# Patient Record
Sex: MALE | Race: WHITE | NOT HISPANIC OR LATINO | URBAN - METROPOLITAN AREA
[De-identification: names, ages, dates, MRNs, and addresses within clinical notes are randomized per-mention and may not be internally consistent; named-entity substitution may affect disease eponyms.]

---

## 2018-11-13 ENCOUNTER — INPATIENT (INPATIENT)
Facility: HOSPITAL | Age: 58
LOS: 1 days | Discharge: HOME | DRG: 638 | End: 2018-11-15
Attending: FAMILY MEDICINE | Admitting: FAMILY MEDICINE
Payer: COMMERCIAL

## 2018-11-13 VITALS
SYSTOLIC BLOOD PRESSURE: 126 MMHG | OXYGEN SATURATION: 98 % | WEIGHT: 175.05 LBS | HEIGHT: 65 IN | HEART RATE: 130 BPM | DIASTOLIC BLOOD PRESSURE: 79 MMHG | RESPIRATION RATE: 16 BRPM

## 2018-11-13 DIAGNOSIS — E13.10 OTHER SPECIFIED DIABETES MELLITUS WITH KETOACIDOSIS WITHOUT COMA: ICD-10-CM

## 2018-11-13 DIAGNOSIS — R00.0 TACHYCARDIA, UNSPECIFIED: ICD-10-CM

## 2018-11-13 DIAGNOSIS — N17.9 ACUTE KIDNEY FAILURE, UNSPECIFIED: ICD-10-CM

## 2018-11-13 DIAGNOSIS — Z29.9 ENCOUNTER FOR PROPHYLACTIC MEASURES, UNSPECIFIED: ICD-10-CM

## 2018-11-13 DIAGNOSIS — E10.10 TYPE 1 DIABETES MELLITUS WITH KETOACIDOSIS WITHOUT COMA: ICD-10-CM

## 2018-11-13 LAB
ACETONE SERPL-MCNC: ABNORMAL
ALBUMIN SERPL ELPH-MCNC: 4 G/DL — SIGNIFICANT CHANGE UP (ref 3.3–5)
ALP SERPL-CCNC: 101 U/L — SIGNIFICANT CHANGE UP (ref 30–120)
ALT FLD-CCNC: 53 U/L DA — SIGNIFICANT CHANGE UP (ref 10–60)
AMPHET UR-MCNC: NEGATIVE — SIGNIFICANT CHANGE UP
ANION GAP SERPL CALC-SCNC: 11 MMOL/L — SIGNIFICANT CHANGE UP (ref 5–17)
ANION GAP SERPL CALC-SCNC: 17 MMOL/L — SIGNIFICANT CHANGE UP (ref 5–17)
APPEARANCE UR: CLEAR — SIGNIFICANT CHANGE UP
APTT BLD: 26.4 SEC — LOW (ref 27.5–36.3)
AST SERPL-CCNC: 25 U/L — SIGNIFICANT CHANGE UP (ref 10–40)
BARBITURATES UR SCN-MCNC: NEGATIVE — SIGNIFICANT CHANGE UP
BASE EXCESS BLDV CALC-SCNC: -3.9 MMOL/L — LOW (ref -2–2)
BASOPHILS # BLD AUTO: 0.05 K/UL — SIGNIFICANT CHANGE UP (ref 0–0.2)
BASOPHILS NFR BLD AUTO: 0.3 % — SIGNIFICANT CHANGE UP (ref 0–2)
BENZODIAZ UR-MCNC: NEGATIVE — SIGNIFICANT CHANGE UP
BILIRUB SERPL-MCNC: 0.8 MG/DL — SIGNIFICANT CHANGE UP (ref 0.2–1.2)
BILIRUB UR-MCNC: NEGATIVE — SIGNIFICANT CHANGE UP
BUN SERPL-MCNC: 17 MG/DL — SIGNIFICANT CHANGE UP (ref 7–23)
BUN SERPL-MCNC: 19 MG/DL — SIGNIFICANT CHANGE UP (ref 7–23)
CALCIUM SERPL-MCNC: 8.1 MG/DL — LOW (ref 8.4–10.5)
CALCIUM SERPL-MCNC: 9.9 MG/DL — SIGNIFICANT CHANGE UP (ref 8.4–10.5)
CHLORIDE SERPL-SCNC: 105 MMOL/L — SIGNIFICANT CHANGE UP (ref 96–108)
CHLORIDE SERPL-SCNC: 98 MMOL/L — SIGNIFICANT CHANGE UP (ref 96–108)
CO2 SERPL-SCNC: 24 MMOL/L — SIGNIFICANT CHANGE UP (ref 22–31)
CO2 SERPL-SCNC: 24 MMOL/L — SIGNIFICANT CHANGE UP (ref 22–31)
COCAINE METAB.OTHER UR-MCNC: NEGATIVE — SIGNIFICANT CHANGE UP
COLOR SPEC: YELLOW — SIGNIFICANT CHANGE UP
CREAT SERPL-MCNC: 1.14 MG/DL — SIGNIFICANT CHANGE UP (ref 0.5–1.3)
CREAT SERPL-MCNC: 1.41 MG/DL — HIGH (ref 0.5–1.3)
D DIMER BLD IA.RAPID-MCNC: <150 NG/ML DDU — SIGNIFICANT CHANGE UP
DIFF PNL FLD: ABNORMAL
EOSINOPHIL # BLD AUTO: 0.01 K/UL — SIGNIFICANT CHANGE UP (ref 0–0.5)
EOSINOPHIL NFR BLD AUTO: 0.1 % — SIGNIFICANT CHANGE UP (ref 0–6)
ETHANOL SERPL-MCNC: <3 MG/DL — SIGNIFICANT CHANGE UP (ref 0–3)
GAS PNL BLDV: SIGNIFICANT CHANGE UP
GLUCOSE BLDC GLUCOMTR-MCNC: 236 MG/DL — HIGH (ref 70–99)
GLUCOSE BLDC GLUCOMTR-MCNC: 309 MG/DL — HIGH (ref 70–99)
GLUCOSE BLDC GLUCOMTR-MCNC: 378 MG/DL — HIGH (ref 70–99)
GLUCOSE SERPL-MCNC: 220 MG/DL — HIGH (ref 70–99)
GLUCOSE SERPL-MCNC: 392 MG/DL — HIGH (ref 70–99)
GLUCOSE UR QL: 1000 MG/DL
HCO3 BLDV-SCNC: 20 MMOL/L — LOW (ref 21–29)
HCT VFR BLD CALC: 45.8 % — SIGNIFICANT CHANGE UP (ref 39–50)
HGB BLD-MCNC: 15.9 G/DL — SIGNIFICANT CHANGE UP (ref 13–17)
IMM GRANULOCYTES NFR BLD AUTO: 0.5 % — SIGNIFICANT CHANGE UP (ref 0–1.5)
INR BLD: 1.15 RATIO — SIGNIFICANT CHANGE UP (ref 0.88–1.16)
KETONES UR-MCNC: ABNORMAL
LACTATE SERPL-SCNC: 2.3 MMOL/L — HIGH (ref 0.7–2)
LACTATE SERPL-SCNC: 3.3 MMOL/L — HIGH (ref 0.7–2)
LEUKOCYTE ESTERASE UR-ACNC: NEGATIVE — SIGNIFICANT CHANGE UP
LYMPHOCYTES # BLD AUTO: 0.93 K/UL — LOW (ref 1–3.3)
LYMPHOCYTES # BLD AUTO: 4.9 % — LOW (ref 13–44)
MAGNESIUM SERPL-MCNC: 1.5 MG/DL — LOW (ref 1.6–2.6)
MCHC RBC-ENTMCNC: 30.9 PG — SIGNIFICANT CHANGE UP (ref 27–34)
MCHC RBC-ENTMCNC: 34.7 GM/DL — SIGNIFICANT CHANGE UP (ref 32–36)
MCV RBC AUTO: 89.1 FL — SIGNIFICANT CHANGE UP (ref 80–100)
METHADONE UR-MCNC: NEGATIVE — SIGNIFICANT CHANGE UP
MONOCYTES # BLD AUTO: 1.47 K/UL — HIGH (ref 0–0.9)
MONOCYTES NFR BLD AUTO: 7.8 % — SIGNIFICANT CHANGE UP (ref 2–14)
NEUTROPHILS # BLD AUTO: 16.37 K/UL — HIGH (ref 1.8–7.4)
NEUTROPHILS NFR BLD AUTO: 86.4 % — HIGH (ref 43–77)
NITRITE UR-MCNC: NEGATIVE — SIGNIFICANT CHANGE UP
NRBC # BLD: 0 /100 WBCS — SIGNIFICANT CHANGE UP (ref 0–0)
OPIATES UR-MCNC: NEGATIVE — SIGNIFICANT CHANGE UP
PCO2 BLDV: 45 MMHG — SIGNIFICANT CHANGE UP (ref 35–50)
PCP SPEC-MCNC: SIGNIFICANT CHANGE UP
PCP UR-MCNC: NEGATIVE — SIGNIFICANT CHANGE UP
PH BLDV: 7.3 — LOW (ref 7.35–7.45)
PH UR: 5 — SIGNIFICANT CHANGE UP (ref 5–8)
PHOSPHATE SERPL-MCNC: 2.2 MG/DL — LOW (ref 2.5–4.5)
PLATELET # BLD AUTO: 363 K/UL — SIGNIFICANT CHANGE UP (ref 150–400)
PO2 BLDV: 30 MMHG — SIGNIFICANT CHANGE UP (ref 25–45)
POTASSIUM SERPL-MCNC: 4 MMOL/L — SIGNIFICANT CHANGE UP (ref 3.5–5.3)
POTASSIUM SERPL-MCNC: 4.4 MMOL/L — SIGNIFICANT CHANGE UP (ref 3.5–5.3)
POTASSIUM SERPL-SCNC: 4 MMOL/L — SIGNIFICANT CHANGE UP (ref 3.5–5.3)
POTASSIUM SERPL-SCNC: 4.4 MMOL/L — SIGNIFICANT CHANGE UP (ref 3.5–5.3)
PROT SERPL-MCNC: 7.5 G/DL — SIGNIFICANT CHANGE UP (ref 6–8.3)
PROT UR-MCNC: 30 MG/DL
PROTHROM AB SERPL-ACNC: 12.6 SEC — SIGNIFICANT CHANGE UP (ref 10–12.9)
RBC # BLD: 5.14 M/UL — SIGNIFICANT CHANGE UP (ref 4.2–5.8)
RBC # FLD: 12.7 % — SIGNIFICANT CHANGE UP (ref 10.3–14.5)
RBC CASTS # UR COMP ASSIST: SIGNIFICANT CHANGE UP /HPF (ref 0–4)
SAO2 % BLDV: 49 % — LOW (ref 67–88)
SODIUM SERPL-SCNC: 139 MMOL/L — SIGNIFICANT CHANGE UP (ref 135–145)
SODIUM SERPL-SCNC: 140 MMOL/L — SIGNIFICANT CHANGE UP (ref 135–145)
SP GR SPEC: 1.02 — SIGNIFICANT CHANGE UP (ref 1.01–1.02)
THC UR QL: POSITIVE
TROPONIN I SERPL-MCNC: 0 NG/ML — LOW (ref 0.02–0.06)
UROBILINOGEN FLD QL: NEGATIVE MG/DL — SIGNIFICANT CHANGE UP
WBC # BLD: 18.93 K/UL — HIGH (ref 3.8–10.5)
WBC # FLD AUTO: 18.93 K/UL — HIGH (ref 3.8–10.5)
WBC UR QL: SIGNIFICANT CHANGE UP

## 2018-11-13 PROCEDURE — 71045 X-RAY EXAM CHEST 1 VIEW: CPT | Mod: 26

## 2018-11-13 PROCEDURE — 99285 EMERGENCY DEPT VISIT HI MDM: CPT

## 2018-11-13 PROCEDURE — 93010 ELECTROCARDIOGRAM REPORT: CPT

## 2018-11-13 PROCEDURE — 99223 1ST HOSP IP/OBS HIGH 75: CPT | Mod: AI

## 2018-11-13 RX ORDER — INSULIN GLARGINE 100 [IU]/ML
10 INJECTION, SOLUTION SUBCUTANEOUS AT BEDTIME
Qty: 0 | Refills: 0 | Status: DISCONTINUED | OUTPATIENT
Start: 2018-11-13 | End: 2018-11-15

## 2018-11-13 RX ORDER — SODIUM CHLORIDE 9 MG/ML
1000 INJECTION, SOLUTION INTRAVENOUS
Qty: 0 | Refills: 0 | Status: DISCONTINUED | OUTPATIENT
Start: 2018-11-13 | End: 2018-11-15

## 2018-11-13 RX ORDER — INSULIN LISPRO 100/ML
VIAL (ML) SUBCUTANEOUS
Qty: 0 | Refills: 0 | Status: DISCONTINUED | OUTPATIENT
Start: 2018-11-13 | End: 2018-11-15

## 2018-11-13 RX ORDER — DEXTROSE 50 % IN WATER 50 %
25 SYRINGE (ML) INTRAVENOUS ONCE
Qty: 0 | Refills: 0 | Status: DISCONTINUED | OUTPATIENT
Start: 2018-11-13 | End: 2018-11-15

## 2018-11-13 RX ORDER — SODIUM CHLORIDE 9 MG/ML
1000 INJECTION, SOLUTION INTRAVENOUS
Qty: 0 | Refills: 0 | Status: DISCONTINUED | OUTPATIENT
Start: 2018-11-13 | End: 2018-11-13

## 2018-11-13 RX ORDER — HEPARIN SODIUM 5000 [USP'U]/ML
5000 INJECTION INTRAVENOUS; SUBCUTANEOUS EVERY 8 HOURS
Qty: 0 | Refills: 0 | Status: DISCONTINUED | OUTPATIENT
Start: 2018-11-13 | End: 2018-11-15

## 2018-11-13 RX ORDER — ATORVASTATIN CALCIUM 80 MG/1
40 TABLET, FILM COATED ORAL AT BEDTIME
Qty: 0 | Refills: 0 | Status: DISCONTINUED | OUTPATIENT
Start: 2018-11-13 | End: 2018-11-15

## 2018-11-13 RX ORDER — INSULIN HUMAN 100 [IU]/ML
5 INJECTION, SOLUTION SUBCUTANEOUS
Qty: 100 | Refills: 0 | Status: DISCONTINUED | OUTPATIENT
Start: 2018-11-13 | End: 2018-11-14

## 2018-11-13 RX ORDER — SODIUM CHLORIDE 9 MG/ML
1000 INJECTION, SOLUTION INTRAVENOUS
Qty: 0 | Refills: 0 | Status: DISCONTINUED | OUTPATIENT
Start: 2018-11-13 | End: 2018-11-14

## 2018-11-13 RX ORDER — INSULIN HUMAN 100 [IU]/ML
10 INJECTION, SOLUTION SUBCUTANEOUS
Qty: 100 | Refills: 0 | Status: DISCONTINUED | OUTPATIENT
Start: 2018-11-13 | End: 2018-11-13

## 2018-11-13 RX ORDER — INSULIN HUMAN 100 [IU]/ML
5 INJECTION, SOLUTION SUBCUTANEOUS
Qty: 50 | Refills: 0 | Status: DISCONTINUED | OUTPATIENT
Start: 2018-11-13 | End: 2018-11-13

## 2018-11-13 RX ORDER — DEXTROSE 50 % IN WATER 50 %
15 SYRINGE (ML) INTRAVENOUS ONCE
Qty: 0 | Refills: 0 | Status: DISCONTINUED | OUTPATIENT
Start: 2018-11-13 | End: 2018-11-15

## 2018-11-13 RX ORDER — SODIUM CHLORIDE 9 MG/ML
1000 INJECTION INTRAMUSCULAR; INTRAVENOUS; SUBCUTANEOUS
Qty: 0 | Refills: 0 | Status: DISCONTINUED | OUTPATIENT
Start: 2018-11-13 | End: 2018-11-13

## 2018-11-13 RX ORDER — INSULIN HUMAN 100 [IU]/ML
5 INJECTION, SOLUTION SUBCUTANEOUS ONCE
Qty: 0 | Refills: 0 | Status: COMPLETED | OUTPATIENT
Start: 2018-11-13 | End: 2018-11-13

## 2018-11-13 RX ORDER — ATORVASTATIN CALCIUM 80 MG/1
1 TABLET, FILM COATED ORAL
Qty: 0 | Refills: 0 | COMMUNITY

## 2018-11-13 RX ORDER — GLUCAGON INJECTION, SOLUTION 0.5 MG/.1ML
1 INJECTION, SOLUTION SUBCUTANEOUS ONCE
Qty: 0 | Refills: 0 | Status: DISCONTINUED | OUTPATIENT
Start: 2018-11-13 | End: 2018-11-15

## 2018-11-13 RX ORDER — EZETIMIBE 10 MG/1
1 TABLET ORAL
Qty: 0 | Refills: 0 | COMMUNITY

## 2018-11-13 RX ORDER — SODIUM CHLORIDE 9 MG/ML
1000 INJECTION INTRAMUSCULAR; INTRAVENOUS; SUBCUTANEOUS
Qty: 0 | Refills: 0 | Status: COMPLETED | OUTPATIENT
Start: 2018-11-13 | End: 2018-11-13

## 2018-11-13 RX ORDER — INSULIN GLARGINE 100 [IU]/ML
10 INJECTION, SOLUTION SUBCUTANEOUS AT BEDTIME
Qty: 0 | Refills: 0 | Status: DISCONTINUED | OUTPATIENT
Start: 2018-11-13 | End: 2018-11-13

## 2018-11-13 RX ORDER — SODIUM CHLORIDE 9 MG/ML
1000 INJECTION INTRAMUSCULAR; INTRAVENOUS; SUBCUTANEOUS ONCE
Qty: 0 | Refills: 0 | Status: COMPLETED | OUTPATIENT
Start: 2018-11-13 | End: 2018-11-13

## 2018-11-13 RX ORDER — DEXTROSE 50 % IN WATER 50 %
12.5 SYRINGE (ML) INTRAVENOUS ONCE
Qty: 0 | Refills: 0 | Status: DISCONTINUED | OUTPATIENT
Start: 2018-11-13 | End: 2018-11-15

## 2018-11-13 RX ORDER — RAMIPRIL 5 MG
1 CAPSULE ORAL
Qty: 0 | Refills: 0 | COMMUNITY

## 2018-11-13 RX ORDER — INSULIN LISPRO 100/ML
VIAL (ML) SUBCUTANEOUS AT BEDTIME
Qty: 0 | Refills: 0 | Status: DISCONTINUED | OUTPATIENT
Start: 2018-11-13 | End: 2018-11-15

## 2018-11-13 RX ORDER — INSULIN GLARGINE 100 [IU]/ML
10 INJECTION, SOLUTION SUBCUTANEOUS ONCE
Qty: 0 | Refills: 0 | Status: DISCONTINUED | OUTPATIENT
Start: 2018-11-13 | End: 2018-11-13

## 2018-11-13 RX ADMIN — Medication 6: at 22:19

## 2018-11-13 RX ADMIN — SODIUM CHLORIDE 1000 MILLILITER(S): 9 INJECTION INTRAMUSCULAR; INTRAVENOUS; SUBCUTANEOUS at 17:41

## 2018-11-13 RX ADMIN — INSULIN HUMAN 5 UNIT(S)/HR: 100 INJECTION, SOLUTION SUBCUTANEOUS at 22:45

## 2018-11-13 RX ADMIN — SODIUM CHLORIDE 1000 MILLILITER(S): 9 INJECTION INTRAMUSCULAR; INTRAVENOUS; SUBCUTANEOUS at 16:53

## 2018-11-13 RX ADMIN — SODIUM CHLORIDE 1000 MILLILITER(S): 9 INJECTION INTRAMUSCULAR; INTRAVENOUS; SUBCUTANEOUS at 19:14

## 2018-11-13 RX ADMIN — INSULIN HUMAN 5 UNIT(S): 100 INJECTION, SOLUTION SUBCUTANEOUS at 16:52

## 2018-11-13 RX ADMIN — INSULIN HUMAN 5 UNIT(S)/HR: 100 INJECTION, SOLUTION SUBCUTANEOUS at 20:09

## 2018-11-13 RX ADMIN — INSULIN GLARGINE 10 UNIT(S): 100 INJECTION, SOLUTION SUBCUTANEOUS at 21:07

## 2018-11-13 RX ADMIN — INSULIN HUMAN 5 UNIT(S)/HR: 100 INJECTION, SOLUTION SUBCUTANEOUS at 18:49

## 2018-11-13 RX ADMIN — SODIUM CHLORIDE 250 MILLILITER(S): 9 INJECTION, SOLUTION INTRAVENOUS at 18:39

## 2018-11-13 NOTE — H&P ADULT - PROBLEM SELECTOR PLAN 4
IMPROVE VTE Individual Risk Assessment          RISK                                                          Points  [  ] Previous VTE                                                 3  [  ] Thrombophilia                                              2  [  ] Lower limb paralysis                                    2        (unable to hold up >15 seconds)    [  ] Current Cancer                                             2         (within 6 months)  [  ] Immobilization > 24 hrs                              1  [  ] ICU/CCU stay > 24 hours                            1  [  ] Age > 60                                                        1    IMPROVE VTE Score 0    - heparin subq for DVT ppx

## 2018-11-13 NOTE — ED ADULT TRIAGE NOTE - CHIEF COMPLAINT QUOTE
Dr New sent me here because my heart was racing this morning after the treadmill and I am nauseous, my heart rate is in the 130.

## 2018-11-13 NOTE — ED PROVIDER NOTE - OBJECTIVE STATEMENT
59 y/o male with PMHx of IDDM presents to the ED for NV and tachycardia starting this morning. Pt states he woke up this morning and was hyperglycemics in the 160s, he went to workout and then checked it again, at that point it was 365. Pt also noticed he remained tachycardic after workout, usually his HR decreases quickly. After tachycardia onset pt started feeling NV. Pt took insulin when he saw that his blood glucose was 365, usually takes extra insulin for hyperglycemia but did not today because of tachycardia. Denies recent sickness, urinary sx, CP, SOB or other sx. Pt was seen at Dr. New's office today and told to come to the ED for labs and workup. Pt had a stress test 2 years ago with Dr. Amato which was normal. PCP- Dr. New. Endocrinologist- Dr. Araujo. Nonsmoker. No ETOH use. No illicit drug use.

## 2018-11-13 NOTE — ED ADULT NURSE NOTE - NSIMPLEMENTINTERV_GEN_ALL_ED
Implemented All Universal Safety Interventions:  [place] to call system. Call bell, personal items and telephone within reach. Instruct patient to call for assistance. Room bathroom lighting operational. Non-slip footwear when patient is off stretcher. Physically safe environment: no spills, clutter or unnecessary equipment. Stretcher in lowest position, wheels locked, appropriate side rails in place.

## 2018-11-13 NOTE — H&P ADULT - NSHPPHYSICALEXAM_GEN_ALL_CORE
PHYSICAL EXAM:  Vital Signs Last 24 Hrs  T(C): 37.5 (13 Nov 2018 18:35), Max: 37.5 (13 Nov 2018 18:35)  T(F): 99.5 (13 Nov 2018 18:35), Max: 99.5 (13 Nov 2018 18:35)  HR: 110 (13 Nov 2018 19:00) (108 - 130)  BP: 111/71 (13 Nov 2018 19:00) (111/71 - 134/85)  BP(mean): --  RR: 21 (13 Nov 2018 19:00) (16 - 25)  SpO2: 100% (13 Nov 2018 19:00) (96% - 100%)    GENERAL:     NAD, well-groomed, well-developed  HEAD:     atraumatic, normocephalic  EYES:     EOMI, conjunctiva and sclera clear  ENMT:     no tonsillar erythema or exudates or enlargement, no oral lesions, moist mucous membranes, good dentition  NECK:     supple, no JVD  RESPIRATORY:     clear to auscultation bilaterally, no rales or rhonchi or wheezing or rubs  CARDIOVASCULAR:     tachycardic, no murmurs or rubs or gallops, 2+ peripheral pulses  GASTROINTESTINAL:     soft, nontender, nondistended, no hepatosplenomegaly palpated, bowel sounds present  EXTREMITIES:     no clubbing or cyanosis or edema  MUSCULOSKELETAL:     no joint pain or swelling or deformities  NERVOUS SYSTEM:     motor strength intact with 5/5 B/L upper and lower extremities, no gross sensory deficits  SKIN:     no rashes or lesions  PSYCH:     appropriate, alert and orientated x3, good concentration PHYSICAL EXAM:  Vital Signs Last 24 Hrs  T(C): 37.5 (13 Nov 2018 18:35), Max: 37.5 (13 Nov 2018 18:35)  T(F): 99.5 (13 Nov 2018 18:35), Max: 99.5 (13 Nov 2018 18:35)  HR: 110 (13 Nov 2018 19:00) (108 - 130)  BP: 111/71 (13 Nov 2018 19:00) (111/71 - 134/85)  BP(mean): --  RR: 21 (13 Nov 2018 19:00) (16 - 25)  SpO2: 100% (13 Nov 2018 19:00) (96% - 100%)    GENERAL:     NAD, well-groomed, well-developed  HEAD:     atraumatic, normocephalic  EYES:     EOMI, conjunctiva and sclera clear  ENMT:     no tonsillar erythema or exudates or enlargement, no oral lesions, moist mucous membranes, good dentition  NECK:     supple, no JVD  RESPIRATORY:     clear to auscultation bilaterally, no rales or rhonchi or wheezing or rubs  CARDIOVASCULAR:     tachycardic, no murmurs or rubs or gallops, 2+ peripheral pulses  GASTROINTESTINAL:     soft, nontender, nondistended, no hepatosplenomegaly palpated, bowel sounds present, +pump site present, clean and dry  EXTREMITIES:     no clubbing or cyanosis or edema  MUSCULOSKELETAL:     no joint pain or swelling or deformities  NERVOUS SYSTEM:     motor strength intact with 5/5 B/L upper and lower extremities, no gross sensory deficits  SKIN:     no rashes or lesions  PSYCH:     appropriate, alert and orientated x3, good concentration

## 2018-11-13 NOTE — H&P ADULT - PROBLEM SELECTOR PLAN 1
- admitted to medicine  - will check BMP again -> if AG still elevated, will admit to [facility]PC[facility]  - if AG closed, will start lantus and eventually DC drip  - monitor electrolytes  - trend lactate  - continue with IV hydration  - endocrinology consult  - monitor for infection -> hold empiric abx for now

## 2018-11-13 NOTE — ED ADULT NURSE NOTE - ED STAT RN HANDOFF DETAILS 2
Report given to Day shift RN.  Patient was interviewed and examined by KUSH Bradley, diabetic practitioner. .  Insulin pump and usage discussed.  Patient removed pump as it was explained that we will absolutely be followed and treated by the medical staff while he is in the hospital.  Patient states he understands.

## 2018-11-13 NOTE — ED PROVIDER NOTE - MEDICAL DECISION MAKING DETAILS
59 y/o male with PMHx of IDDM with tachycardia and hyperglycemia today. HR 130s. Uncertain etiology. Plan for labs, cardiology consult. 57 y/o male with PMHx of IDDM with tachycardia and hyperglycemia today. HR 130s. Uncertain etiology. Plan for labs, cardiology consult. May need admission for IVF and DM control.

## 2018-11-13 NOTE — H&P ADULT - NSHPLABSRESULTS_GEN_ALL_CORE
LABS:                        15.9   18.93<H> )-----------( 363      ( 2018 16:57 )             45.8     139    |  98     |  19     ----------------------------<  392<H>    2018 16:57  4.4     |  24     |  1.41<H>        Ca 9.9           2018 16:57    Lactate Trend   @ 18:20 Lactate:3.3       TPro  7.5    /  Alb  4.0    /  TBili  0.8    /  DBili  x      /  AST  25     /  ALT  53     /  AlkPhos  101    2018 16:57    PT/INR - ( 2018 16:57 )   PT: 12.6 ;   INR: 1.15          PTT - ( 2018 16:57 )  PTT:26.4<L>    Urinalysis Basic - ( 2018 17:03 )    Color: Yellow / Appearance: Clear / S.020 / pH: x  Gluc: x / Ketone: Large  / Bili: Negative / Urobili: Negative mg/dL   Blood: x / Protein: 30 mg/dL / Nitrite: Negative   Leuk Esterase: Negative / RBC: 0-2 /HPF / WBC 0-2   Sq Epi: x / Non Sq Epi: x / Bacteria: x      Troponin trend:  .000   @ 16:57      EKG:  sinus tach with inferior q waves  Radiology:   < from: Xray Chest 1 View- PORTABLE-Urgent (18 @ 17:29) >    INTERPRETATION:  History: Tachycardia    Portable view of the chest is performed. There are no prior studies for   comparison.    The heart is not enlarged. The trachea is midline. There is no focal   infiltrate or pleural effusion. Osseous structures are unremarkable.    Impression: No active pulmonary disease.    < end of copied text >

## 2018-11-13 NOTE — H&P ADULT - PROBLEM SELECTOR PLAN 3
- likely 2/2 dehydration, DKA  - monitor with IV hydration  - f/u cardiology consult  - hold rate controlling agents for now

## 2018-11-13 NOTE — H&P ADULT - ASSESSMENT
58M with DM1 with insulin pump (reported last a1c 7.2% in 8/2018) who presents with hyperglycemia and tachycardia.  Borderline DKA. 58M with DM1 with insulin pump (reported last a1c 7.2% in 8/2018) who presents with hyperglycemia and tachycardia.  Borderline DKA.  Unclear etiology, possible some occult infection given elevated WBC.  However, patient is afebrile and has no other symptoms.

## 2018-11-13 NOTE — CONSULT NOTE ADULT - SUBJECTIVE AND OBJECTIVE BOX
History of Present Illness: The patient is a 58 year old male with a history of IDDM on insulin pump who presents with tachycardia and hyperglycemia. He woke up this morning, noted glucose elevated at 170. He exercised for 30 min as this usually brings down his glucose level. No chest pain or shortness of breath during this. He did not drink much. After, he started to feel nauseous and vomited. He also had palpitations. He states he was unable to keep fluids down.     Past Medical/Surgical History:  IDDM    Medications:  Home Medications:  atorvastatin 40 mg oral tablet: 1 tab(s) orally once a day (13 Nov 2018 16:28)  insulin: Via punp (13 Nov 2018 16:28)  ramipril 10 mg oral capsule: 1 cap(s) orally once a day (13 Nov 2018 16:28)  Zetia 10 mg oral tablet: 1 tab(s) orally once a day (13 Nov 2018 16:28)      Family History: Non-contributory family history of premature cardiovascular atherosclerotic disease    Social History: No tobacco, alcohol or drug use    Review of Systems:  General: No fevers, chills, weight loss or gain  Skin: No rashes, color changes  Cardiovascular: No chest pain, orthopnea  Respiratory: No shortness of breath, cough  Gastrointestinal: No nausea, abdominal pain  Genitourinary: No incontinence, pain with urination  Musculoskeletal: No pain, swelling, decreased range of motion  Neurological: No headache, weakness  Psychiatric: No depression, anxiety  Endocrine: No weight loss or gain, increased thirst  All other systems are comprehensively negative.    Physical Exam:  Vitals:        Vital Signs Last 24 Hrs  T(C): --  T(F): --  HR: 130 (13 Nov 2018 16:03) (130 - 130)  BP: 126/79 (13 Nov 2018 16:03) (126/79 - 126/79)  BP(mean): --  RR: 16 (13 Nov 2018 16:03) (16 - 16)  SpO2: 98% (13 Nov 2018 16:03) (98% - 98%)  General: NAD  HEENT: MMM  Neck: No JVD, no carotid bruit  Lungs: CTAB  CV: RRR, nl S1/S2, no M/R/G  Abdomen: S/NT/ND, +BS  Extremities: No LE edema, no cyanosis  Neuro: AAOx3, non-focal  Skin: No rash    Labs:                        15.9   18.93 )-----------( 363      ( 13 Nov 2018 16:57 )             45.8                   ECG: Sinus tachycardia, normal axis, small inferior Q waves

## 2018-11-13 NOTE — CONSULT NOTE ADULT - ASSESSMENT
The patient is a 58 year old male with a history of IDDM on insulin pump who presents with tachycardia and hyperglycemia.    Plan:  - Suspect tachycardia is likely due to dehydration and hyperglycemia  - Await labs to evaluate for DKA  - Continue IV fluids  - No need for beta blocker unless remains tachycardic despite correction of dehydration and hyperglycemia  - One set of cardiac enzymes ordered; no need to trend unless elevated

## 2018-11-13 NOTE — H&P ADULT - HISTORY OF PRESENT ILLNESS
58M with DM1 with insulin pump (reported last a1c 7.2% in 8/2018) who presents with hyperglycemia and tachycardia.  Patient woke up with a FS of 170 which is elevated for him.  He decided to go on the treadmill for 30 mins and rechecked his FS and it was 365 (which is extremely elevated for him).  Patient also noted his HR would be persistent elevated to the 140s, even after two hours.  Patient admitted to some nausea and vomiting (non-bloody), slight palpitations and dizziness.  Denied any chest pain or SOB or abdominal pain or diarrhea.  PAtient went to his PMD, had an EKG, and was told to come to the ED for further evaluation.  Before today, patient has been feeling well and has been in his usual state of health.  Denied any fevers or any recent illnesses.  Denied any sick contacts.  Recently traveled to [place] last month.  Had already received his flu vaccination.  In the ED, patient was found to have an initial FS of 378.  His AG was 17 and his pH 7.30.  Patient was started on an insulin gtt for possible DKA.  Patient was also given 2L of fluids, found to have an elevated lactate of 3.3, and was given another liter of fluids.  Currently the patient already feels improved.

## 2018-11-14 LAB
ANION GAP SERPL CALC-SCNC: 10 MMOL/L — SIGNIFICANT CHANGE UP (ref 5–17)
BASOPHILS # BLD AUTO: 0.03 K/UL — SIGNIFICANT CHANGE UP (ref 0–0.2)
BASOPHILS NFR BLD AUTO: 0.3 % — SIGNIFICANT CHANGE UP (ref 0–2)
BUN SERPL-MCNC: 12 MG/DL — SIGNIFICANT CHANGE UP (ref 7–23)
CALCIUM SERPL-MCNC: 8.2 MG/DL — LOW (ref 8.4–10.5)
CHLORIDE SERPL-SCNC: 107 MMOL/L — SIGNIFICANT CHANGE UP (ref 96–108)
CO2 SERPL-SCNC: 22 MMOL/L — SIGNIFICANT CHANGE UP (ref 22–31)
CREAT SERPL-MCNC: 0.82 MG/DL — SIGNIFICANT CHANGE UP (ref 0.5–1.3)
EOSINOPHIL # BLD AUTO: 0.08 K/UL — SIGNIFICANT CHANGE UP (ref 0–0.5)
EOSINOPHIL NFR BLD AUTO: 0.7 % — SIGNIFICANT CHANGE UP (ref 0–6)
GLUCOSE SERPL-MCNC: 169 MG/DL — HIGH (ref 70–99)
HBA1C BLD-MCNC: 7.6 % — HIGH (ref 4–5.6)
HCT VFR BLD CALC: 34.8 % — LOW (ref 39–50)
HGB BLD-MCNC: 12.3 G/DL — LOW (ref 13–17)
IMM GRANULOCYTES NFR BLD AUTO: 0.3 % — SIGNIFICANT CHANGE UP (ref 0–1.5)
LYMPHOCYTES # BLD AUTO: 1.72 K/UL — SIGNIFICANT CHANGE UP (ref 1–3.3)
LYMPHOCYTES # BLD AUTO: 15.7 % — SIGNIFICANT CHANGE UP (ref 13–44)
MAGNESIUM SERPL-MCNC: 1.6 MG/DL — SIGNIFICANT CHANGE UP (ref 1.6–2.6)
MCHC RBC-ENTMCNC: 31.1 PG — SIGNIFICANT CHANGE UP (ref 27–34)
MCHC RBC-ENTMCNC: 35.3 GM/DL — SIGNIFICANT CHANGE UP (ref 32–36)
MCV RBC AUTO: 88.1 FL — SIGNIFICANT CHANGE UP (ref 80–100)
MONOCYTES # BLD AUTO: 1.16 K/UL — HIGH (ref 0–0.9)
MONOCYTES NFR BLD AUTO: 10.6 % — SIGNIFICANT CHANGE UP (ref 2–14)
NEUTROPHILS # BLD AUTO: 7.97 K/UL — HIGH (ref 1.8–7.4)
NEUTROPHILS NFR BLD AUTO: 72.4 % — SIGNIFICANT CHANGE UP (ref 43–77)
PHOSPHATE SERPL-MCNC: 3 MG/DL — SIGNIFICANT CHANGE UP (ref 2.5–4.5)
PLATELET # BLD AUTO: 273 K/UL — SIGNIFICANT CHANGE UP (ref 150–400)
POTASSIUM SERPL-MCNC: 3.9 MMOL/L — SIGNIFICANT CHANGE UP (ref 3.5–5.3)
POTASSIUM SERPL-SCNC: 3.9 MMOL/L — SIGNIFICANT CHANGE UP (ref 3.5–5.3)
RBC # BLD: 3.95 M/UL — LOW (ref 4.2–5.8)
RBC # FLD: 13 % — SIGNIFICANT CHANGE UP (ref 10.3–14.5)
SODIUM SERPL-SCNC: 139 MMOL/L — SIGNIFICANT CHANGE UP (ref 135–145)
TSH SERPL-MCNC: 1.63 UIU/ML — SIGNIFICANT CHANGE UP (ref 0.27–4.2)
WBC # BLD: 10.99 K/UL — HIGH (ref 3.8–10.5)
WBC # FLD AUTO: 10.99 K/UL — HIGH (ref 3.8–10.5)

## 2018-11-14 PROCEDURE — 93970 EXTREMITY STUDY: CPT | Mod: 26

## 2018-11-14 PROCEDURE — 99233 SBSQ HOSP IP/OBS HIGH 50: CPT

## 2018-11-14 RX ORDER — INSULIN LISPRO 100/ML
3 VIAL (ML) SUBCUTANEOUS
Qty: 0 | Refills: 0 | Status: DISCONTINUED | OUTPATIENT
Start: 2018-11-14 | End: 2018-11-15

## 2018-11-14 RX ORDER — SODIUM CHLORIDE 9 MG/ML
1000 INJECTION, SOLUTION INTRAVENOUS
Qty: 0 | Refills: 0 | Status: DISCONTINUED | OUTPATIENT
Start: 2018-11-14 | End: 2018-11-14

## 2018-11-14 RX ADMIN — HEPARIN SODIUM 5000 UNIT(S): 5000 INJECTION INTRAVENOUS; SUBCUTANEOUS at 13:48

## 2018-11-14 RX ADMIN — HEPARIN SODIUM 5000 UNIT(S): 5000 INJECTION INTRAVENOUS; SUBCUTANEOUS at 21:10

## 2018-11-14 RX ADMIN — Medication 4: at 17:40

## 2018-11-14 RX ADMIN — ATORVASTATIN CALCIUM 40 MILLIGRAM(S): 80 TABLET, FILM COATED ORAL at 21:11

## 2018-11-14 RX ADMIN — Medication 3 UNIT(S): at 12:58

## 2018-11-14 RX ADMIN — Medication 2: at 12:58

## 2018-11-14 RX ADMIN — Medication 3 UNIT(S): at 17:40

## 2018-11-14 RX ADMIN — Medication 2: at 06:51

## 2018-11-14 RX ADMIN — HEPARIN SODIUM 5000 UNIT(S): 5000 INJECTION INTRAVENOUS; SUBCUTANEOUS at 06:51

## 2018-11-14 RX ADMIN — INSULIN GLARGINE 10 UNIT(S): 100 INJECTION, SOLUTION SUBCUTANEOUS at 22:09

## 2018-11-14 NOTE — ADVANCED PRACTICE NURSE CONSULT - RECOMMEDATIONS
Basal Bolus regime  add nutritional  insulin  monitor glucose trends    Send Pump  and CGM home, refuses to have pump put into storage  Follow

## 2018-11-14 NOTE — PROGRESS NOTE ADULT - SUBJECTIVE AND OBJECTIVE BOX
Chief Complaint: Hyperglycemia, tachycardia    Interval Events: No events overnight. Feels improved. Glucose and heart rates improved.    Review of Systems:  General: No fevers, chills, weight loss or gain  Skin: No rashes, color changes  Cardiovascular: No chest pain, orthopnea  Respiratory: No shortness of breath, cough  Gastrointestinal: No nausea, abdominal pain  Genitourinary: No incontinence, pain with urination  Musculoskeletal: No pain, swelling, decreased range of motion  Neurological: No headache, weakness  Psychiatric: No depression, anxiety  Endocrine: No weight loss or gain, increased thirst  All other systems are comprehensively negative.    Physical Exam:  Vitals:        Vital Signs Last 24 Hrs  T(C): 36.9 (14 Nov 2018 05:44), Max: 37.5 (13 Nov 2018 18:35)  T(F): 98.5 (14 Nov 2018 05:44), Max: 99.5 (13 Nov 2018 18:35)  HR: 73 (14 Nov 2018 05:44) (73 - 130)  BP: 107/64 (14 Nov 2018 05:44) (107/64 - 134/85)  BP(mean): --  RR: 18 (14 Nov 2018 05:44) (16 - 25)  SpO2: 96% (14 Nov 2018 05:44) (96% - 100%)  General: NAD  HEENT: MMM  Neck: No JVD, no carotid bruit  Lungs: CTAB  CV: RRR, nl S1/S2, no M/R/G  Abdomen: S/NT/ND, +BS  Extremities: No LE edema, no cyanosis  Neuro: AAOx3, non-focal  Skin: No rash    Labs:                        12.3   10.99 )-----------( 273      ( 14 Nov 2018 05:56 )             34.8     11-14    139  |  107  |  12  ----------------------------<  169<H>  3.9   |  22  |  0.82    Ca    8.2<L>      14 Nov 2018 05:56  Phos  3.0     11-14  Mg     1.6     11-14    TPro  7.5  /  Alb  4.0  /  TBili  0.8  /  DBili  x   /  AST  25  /  ALT  53  /  AlkPhos  101  11-13    CARDIAC MARKERS ( 13 Nov 2018 16:57 )  .000 ng/mL / x     / x     / x     / x          PT/INR - ( 13 Nov 2018 16:57 )   PT: 12.6 sec;   INR: 1.15 ratio         PTT - ( 13 Nov 2018 16:57 )  PTT:26.4 sec    Telemetry: Sinus rhythm

## 2018-11-14 NOTE — PROGRESS NOTE ADULT - ASSESSMENT
The patient is a 58 year old male with a history of IDDM on insulin pump who presents with tachycardia and hyperglycemia.    Plan:  - Tachycardia was due to dehydration and hyperglycemia  - Heart rates now within normal limits  - On IV fluids  - DKA largely resolved. Anion gap no longer present.  - Medicine and endocrine follow-up

## 2018-11-14 NOTE — PROGRESS NOTE ADULT - PROBLEM SELECTOR PLAN 1
- was impending DKA, but received timely care  - s/p insulin drip, received Lantus 10 units last night  - add pre-meal bolus   - sliding scale   - check HgbA1c  - has used insulin pump for ~ 20 years, noted that he was recalibrating his pump on Monday evening and it was a tedious process - ? related as his sugars were elevated on Tues morning  - endocrinology consult  Dr. Betancourt   - monitor for infection -> hold empiric abx for now

## 2018-11-14 NOTE — PROGRESS NOTE ADULT - ASSESSMENT
58M with DM1 with insulin pump (reported last a1c 7.2% in 8/2018) who presents with hyperglycemia and tachycardia.  Borderline DKA.  Unclear etiology, possible some occult infection given elevated WBC.  However, patient is afebrile and has no other symptoms.

## 2018-11-14 NOTE — PROGRESS NOTE ADULT - PROBLEM SELECTOR PLAN 3
- likely 2/2 dehydration, DKA  - monitor with IV hydration  - resolved  -  cardiology consult appreciated  - patient is low pretest prob for VTE, but does report recent travel to [place] and [place], will get LE Dopplers

## 2018-11-14 NOTE — ADVANCED PRACTICE NURSE CONSULT - ASSESSMENT
Patient is a 58y old  Male who presents with a chief complaint of hyperglycemia, tachycardia, wears a G670 pump with CGM        ROS:  Cardiovascular: No chest Pain, palpitations  Respiratory No SOB, No cough  GI: No nausea,Vomiting,abdominal pain  Endocrine: no polyuria,polydipsia    Daily Height in cm: 165.1 (13 Nov 2018 16:03)      Vital Signs Last 24 Hrs  T(C): 36.9 (14 Nov 2018 05:44), Max: 37.5 (13 Nov 2018 18:35)  T(F): 98.5 (14 Nov 2018 05:44), Max: 99.5 (13 Nov 2018 18:35)  HR: 73 (14 Nov 2018 05:44) (73 - 130)  BP: 107/64 (14 Nov 2018 05:44) (107/64 - 134/85)  BP(mean): --  RR: 18 (14 Nov 2018 05:44) (16 - 25)  SpO2: 96% (14 Nov 2018 05:44) (96% - 100%)      Physical Exam:  General: NAD well groomed well developed  HEENT: normocephalic,  Cardiovascular Regular rate and rhythm  Psych: Alert and oriented x3 normal affect normal mood    Overnight Events on IV insulin transitioned to basal bolus regime    PAST MEDICAL & SURGICAL HISTORY:  DM 1  No significant past surgical history    No Known Allergies      MEDICATIONS  (STANDING):  atorvastatin 40 milliGRAM(s) Oral at bedtime  dextrose 5% + sodium chloride 0.45%. 1000 milliLiter(s) (250 mL/Hr) IV Continuous <Continuous>  dextrose 5% + sodium chloride 0.45%. 1000 milliLiter(s) (125 mL/Hr) IV Continuous <Continuous>  dextrose 5%. 1000 milliLiter(s) (50 mL/Hr) IV Continuous <Continuous>  dextrose 50% Injectable 12.5 Gram(s) IV Push once  dextrose 50% Injectable 25 Gram(s) IV Push once  dextrose 50% Injectable 25 Gram(s) IV Push once  heparin  Injectable 5000 Unit(s) SubCutaneous every 8 hours  insulin glargine Injectable (LANTUS) 10 Unit(s) SubCutaneous at bedtime  insulin lispro (HumaLOG) corrective regimen sliding scale   SubCutaneous three times a day before meals  insulin lispro (HumaLOG) corrective regimen sliding scale   SubCutaneous at bedtime      Home Medications:  atorvastatin 40 mg oral tablet: 1 tab(s) orally once a day (13 Nov 2018 19:06)  insulin: Via punp (13 Nov 2018 19:06)  ramipril 10 mg oral capsule: 1 cap(s) orally once a day (13 Nov 2018 19:06)  Zetia 10 mg oral tablet: 1 tab(s) orally once a day (13 Nov 2018 19:06)      Diet: Consistant Carbohydrate Diet    A1c: pending    eGRF:                                          12.3   10.99 )-----------( 273      ( 14 Nov 2018 05:56 )             34.8       11-14    139  |  107  |  12  ----------------------------<  169<H>  3.9   |  22  |  0.82    Ca    8.2<L>      14 Nov 2018 05:56  Phos  3.0     11-14  Mg     1.6     11-14  POCT Blood Glucose.: 155 mg/dL (14 Nov 2018 05:39)  POCT Blood Glucose.: 165 mg/dL (14 Nov 2018 02:02)  POCT Blood Glucose.: 146 mg/dL (14 Nov 2018 00:15)  POCT Blood Glucose.: 390 mg/dL (13 Nov 2018 22:16)  POCT Blood Glucose.: 221 mg/dL (13 Nov 2018 20:18)  POCT Blood Glucose.: 236 mg/dL (13 Nov 2018 18:16)  POCT Blood Glucose.: 309 mg/dL (13 Nov 2018 17:31)  POCT Blood Glucose.: 378 mg/dL (13 Nov 2018 16:19)

## 2018-11-14 NOTE — PROGRESS NOTE ADULT - SUBJECTIVE AND OBJECTIVE BOX
INTERVAL HPI/OVERNIGHT EVENTS:   Patient seen and examined.  Ate breakfast, no recurrent nausea or vomiting.  Feeling better overall.  Notes travel last month to [place] and long flight to [place] in 2018, no calf pain since.  No fevers, chills, sweats, dizziness, HA, changes in vision, cp, palpitations, sob, persistent cough, n/v/d, abd pain, dysuria, focal weakness.     REVIEW OF SYSTEMS:  See HPI,  all others negative    PHYSICAL EXAM:  Vital Signs Last 24 Hrs  T(C): 36.9 (2018 05:44), Max: 37.5 (2018 18:35)  T(F): 98.5 (2018 05:44), Max: 99.5 (2018 18:35)  HR: 73 (2018 05:44) (73 - 130)  BP: 107/64 (2018 05:44) (107/64 - 134/85)  BP(mean): --  RR: 18 (2018 05:44) (16 - 25)  SpO2: 96% (2018 05:44) (96% - 100%)    GENERAL: NAD, well-groomed, well-developed, awake, alert, oriented x 3, fluent and coherent speech  EYES: EOMI, PERRLA, conjunctiva and sclera clear  ENMT: No tonsillar erythema, exudates, or enlargement; Moist mucous membranes, Good dentition, No lesions  NECK: Supple, No JVD, No Cervical LAD, No thyromegaly, No thyroid nodules felt  NERVOUS SYSTEM:  Good concentration; Moving all 4 extremities; No gross sensory deficits, No facial droop  CHEST WALL: No masses  CHEST/LUNG: Clear to auscultation bilaterally; No rales, rhonchi, wheezing, or rubs  HEART: Regular rate and rhythm; No murmurs, rubs, or gallops  ABDOMEN: Soft, Nontender, obese, Bowel sounds present, No palpable masses or organomegaly, No bruits  EXTREMITIES:  2+ Peripheral Pulses, No clubbing, cyanosis, or edema  LYMPH: No lymphadenopathy    LABS:                        12.3   10.99 )-----------( 273      ( 2018 05:56 )             34.8     2018 05:56    139    |  107    |  12     ----------------------------<  169    3.9     |  22     |  0.82     Ca    8.2        2018 05:56  Phos  3.0       2018 05:56  Mg     1.6       2018 05:56    TPro  7.5    /  Alb  4.0    /  TBili  0.8    /  DBili  x      /  AST  25     /  ALT  53     /  AlkPhos  101    2018 16:57    PT/INR - ( 2018 16:57 )   PT: 12.6 sec;   INR: 1.15 ratio         PTT - ( 2018 16:57 )  PTT:26.4 sec  Urinalysis Basic - ( 2018 17:03 )    Color: Yellow / Appearance: Clear / S.020 / pH: x  Gluc: x / Ketone: Large  / Bili: Negative / Urobili: Negative mg/dL   Blood: x / Protein: 30 mg/dL / Nitrite: Negative   Leuk Esterase: Negative / RBC: 0-2 /HPF / WBC 0-2   Sq Epi: x / Non Sq Epi: x / Bacteria: x         RADIOLOGY & ADDITIONAL TESTS:

## 2018-11-15 ENCOUNTER — TRANSCRIPTION ENCOUNTER (OUTPATIENT)
Age: 58
End: 2018-11-15

## 2018-11-15 VITALS
SYSTOLIC BLOOD PRESSURE: 121 MMHG | OXYGEN SATURATION: 95 % | RESPIRATION RATE: 18 BRPM | DIASTOLIC BLOOD PRESSURE: 75 MMHG | TEMPERATURE: 98 F | HEART RATE: 76 BPM

## 2018-11-15 DIAGNOSIS — E13.10 OTHER SPECIFIED DIABETES MELLITUS WITH KETOACIDOSIS WITHOUT COMA: ICD-10-CM

## 2018-11-15 LAB
ALBUMIN SERPL ELPH-MCNC: 3.1 G/DL — LOW (ref 3.3–5)
ALP SERPL-CCNC: 74 U/L — SIGNIFICANT CHANGE UP (ref 30–120)
ALT FLD-CCNC: 29 U/L DA — SIGNIFICANT CHANGE UP (ref 10–60)
ANION GAP SERPL CALC-SCNC: 12 MMOL/L — SIGNIFICANT CHANGE UP (ref 5–17)
AST SERPL-CCNC: 16 U/L — SIGNIFICANT CHANGE UP (ref 10–40)
BILIRUB SERPL-MCNC: 0.8 MG/DL — SIGNIFICANT CHANGE UP (ref 0.2–1.2)
BUN SERPL-MCNC: 10 MG/DL — SIGNIFICANT CHANGE UP (ref 7–23)
CALCIUM SERPL-MCNC: 9.1 MG/DL — SIGNIFICANT CHANGE UP (ref 8.4–10.5)
CHLORIDE SERPL-SCNC: 104 MMOL/L — SIGNIFICANT CHANGE UP (ref 96–108)
CO2 SERPL-SCNC: 25 MMOL/L — SIGNIFICANT CHANGE UP (ref 22–31)
CREAT SERPL-MCNC: 0.87 MG/DL — SIGNIFICANT CHANGE UP (ref 0.5–1.3)
GLUCOSE SERPL-MCNC: 158 MG/DL — HIGH (ref 70–99)
HCT VFR BLD CALC: 37.1 % — LOW (ref 39–50)
HGB BLD-MCNC: 13.1 G/DL — SIGNIFICANT CHANGE UP (ref 13–17)
MCHC RBC-ENTMCNC: 30.6 PG — SIGNIFICANT CHANGE UP (ref 27–34)
MCHC RBC-ENTMCNC: 35.3 GM/DL — SIGNIFICANT CHANGE UP (ref 32–36)
MCV RBC AUTO: 86.7 FL — SIGNIFICANT CHANGE UP (ref 80–100)
NRBC # BLD: 0 /100 WBCS — SIGNIFICANT CHANGE UP (ref 0–0)
PLATELET # BLD AUTO: 268 K/UL — SIGNIFICANT CHANGE UP (ref 150–400)
POTASSIUM SERPL-MCNC: 4.6 MMOL/L — SIGNIFICANT CHANGE UP (ref 3.5–5.3)
POTASSIUM SERPL-SCNC: 4.6 MMOL/L — SIGNIFICANT CHANGE UP (ref 3.5–5.3)
PROT SERPL-MCNC: 6 G/DL — SIGNIFICANT CHANGE UP (ref 6–8.3)
RBC # BLD: 4.28 M/UL — SIGNIFICANT CHANGE UP (ref 4.2–5.8)
RBC # FLD: 12.7 % — SIGNIFICANT CHANGE UP (ref 10.3–14.5)
SODIUM SERPL-SCNC: 141 MMOL/L — SIGNIFICANT CHANGE UP (ref 135–145)
WBC # BLD: 10.54 K/UL — HIGH (ref 3.8–10.5)
WBC # FLD AUTO: 10.54 K/UL — HIGH (ref 3.8–10.5)

## 2018-11-15 PROCEDURE — 36415 COLL VENOUS BLD VENIPUNCTURE: CPT

## 2018-11-15 PROCEDURE — 87040 BLOOD CULTURE FOR BACTERIA: CPT

## 2018-11-15 PROCEDURE — 99239 HOSP IP/OBS DSCHRG MGMT >30: CPT

## 2018-11-15 PROCEDURE — 84484 ASSAY OF TROPONIN QUANT: CPT

## 2018-11-15 PROCEDURE — 82009 KETONE BODYS QUAL: CPT

## 2018-11-15 PROCEDURE — 71045 X-RAY EXAM CHEST 1 VIEW: CPT

## 2018-11-15 PROCEDURE — 96374 THER/PROPH/DIAG INJ IV PUSH: CPT

## 2018-11-15 PROCEDURE — 84443 ASSAY THYROID STIM HORMONE: CPT

## 2018-11-15 PROCEDURE — 83735 ASSAY OF MAGNESIUM: CPT

## 2018-11-15 PROCEDURE — 82962 GLUCOSE BLOOD TEST: CPT

## 2018-11-15 PROCEDURE — 85027 COMPLETE CBC AUTOMATED: CPT

## 2018-11-15 PROCEDURE — 83605 ASSAY OF LACTIC ACID: CPT

## 2018-11-15 PROCEDURE — 83036 HEMOGLOBIN GLYCOSYLATED A1C: CPT

## 2018-11-15 PROCEDURE — 93005 ELECTROCARDIOGRAM TRACING: CPT

## 2018-11-15 PROCEDURE — 85610 PROTHROMBIN TIME: CPT

## 2018-11-15 PROCEDURE — 85730 THROMBOPLASTIN TIME PARTIAL: CPT

## 2018-11-15 PROCEDURE — 85379 FIBRIN DEGRADATION QUANT: CPT

## 2018-11-15 PROCEDURE — 80053 COMPREHEN METABOLIC PANEL: CPT

## 2018-11-15 PROCEDURE — 93970 EXTREMITY STUDY: CPT

## 2018-11-15 PROCEDURE — 96361 HYDRATE IV INFUSION ADD-ON: CPT

## 2018-11-15 PROCEDURE — 81001 URINALYSIS AUTO W/SCOPE: CPT

## 2018-11-15 PROCEDURE — 80048 BASIC METABOLIC PNL TOTAL CA: CPT

## 2018-11-15 PROCEDURE — 84100 ASSAY OF PHOSPHORUS: CPT

## 2018-11-15 PROCEDURE — 82803 BLOOD GASES ANY COMBINATION: CPT

## 2018-11-15 PROCEDURE — 99285 EMERGENCY DEPT VISIT HI MDM: CPT | Mod: 25

## 2018-11-15 PROCEDURE — 80307 DRUG TEST PRSMV CHEM ANLYZR: CPT

## 2018-11-15 RX ADMIN — Medication 2: at 08:16

## 2018-11-15 RX ADMIN — HEPARIN SODIUM 5000 UNIT(S): 5000 INJECTION INTRAVENOUS; SUBCUTANEOUS at 05:29

## 2018-11-15 RX ADMIN — Medication 3 UNIT(S): at 08:16

## 2018-11-15 NOTE — CONSULT NOTE ADULT - SUBJECTIVE AND OBJECTIVE BOX
Patient is a 58y old  Male who presents with a chief complaint of hyperglycemia, tachycardia (14 Nov 2018 09:34)      Reason For Consult: dka, dm1 uncontrolled    HPI:  58M with DM1 with insulin pump (reported last a1c 7.2% in 8/2018) who presents with hyperglycemia and tachycardia.  Patient woke up with a FS of 170 which is elevated for him.  He decided to go on the treadmill for 30 mins and rechecked his FS and it was 365 (which is extremely elevated for him).  Patient also noted his HR would be persistent elevated to the 140s, even after two hours.  Patient admitted to some nausea and vomiting (non-bloody), slight palpitations and dizziness.  Denied any chest pain or SOB or abdominal pain or diarrhea.  PAtient went to his PMD, had an EKG, and was told to come to the ED for further evaluation.  Before today, patient has been feeling well and has been in his usual state of health.  Denied any fevers or any recent illnesses.  Denied any sick contacts.  Recently traveled to [place] last month.  Had already received his flu vaccination.  In the ED, patient was found to have an initial FS of 378.  His AG was 17 and his pH 7.30.  Patient was started on an insulin gtt for possible DKA.  Patient was also given 2L of fluids, found to have an elevated lactate of 3.3, and was given another liter of fluids.  Currently the patient already feels improved. (13 Nov 2018 19:57)      PAST MEDICAL & SURGICAL HISTORY:  DM (diabetes mellitus)  No significant past surgical history      FAMILY HISTORY:  No pertinent family history in first degree relatives        Social History:    MEDICATIONS  (STANDING):  atorvastatin 40 milliGRAM(s) Oral at bedtime  dextrose 5%. 1000 milliLiter(s) (50 mL/Hr) IV Continuous <Continuous>  dextrose 50% Injectable 12.5 Gram(s) IV Push once  dextrose 50% Injectable 25 Gram(s) IV Push once  dextrose 50% Injectable 25 Gram(s) IV Push once  heparin  Injectable 5000 Unit(s) SubCutaneous every 8 hours  insulin glargine Injectable (LANTUS) 10 Unit(s) SubCutaneous at bedtime  insulin lispro (HumaLOG) corrective regimen sliding scale   SubCutaneous three times a day before meals  insulin lispro (HumaLOG) corrective regimen sliding scale   SubCutaneous at bedtime  insulin lispro Injectable (HumaLOG) 3 Unit(s) SubCutaneous three times a day before meals    MEDICATIONS  (PRN):  dextrose 40% Gel 15 Gram(s) Oral once PRN Blood Glucose LESS THAN 70 milliGRAM(s)/deciliter  glucagon  Injectable 1 milliGRAM(s) IntraMuscular once PRN Glucose LESS THAN 70 milligrams/deciliter        T(C): 36.9 (11-15-18 @ 07:56), Max: 37.1 (11-14-18 @ 23:05)  HR: 59 (11-15-18 @ 07:56) (59 - 85)  BP: 143/73 (11-15-18 @ 07:56) (113/67 - 143/73)  RR: 16 (11-15-18 @ 07:56) (14 - 21)  SpO2: 94% (11-15-18 @ 07:56) (93% - 96%)  Wt(kg): --    PHYSICAL EXAM:  GENERAL: NAD, well-groomed, well-developed  HEAD:  Atraumatic, Normocephalic  NECK: Supple, No JVD, Normal thyroid  CHEST/LUNG: Clear to percussion bilaterally; No rales, rhonchi, wheezing, or rubs  HEART: Regular rate and rhythm; No murmurs, rubs, or gallops  ABDOMEN: Soft, Nontender, Nondistended; Bowel sounds present  EXTREMITIES:  2+ Peripheral Pulses, No clubbing, cyanosis, or edema  SKIN: No rashes or lesions    CAPILLARY BLOOD GLUCOSE      POCT Blood Glucose.: 179 mg/dL (15 Nov 2018 07:48)  POCT Blood Glucose.: 150 mg/dL (14 Nov 2018 21:54)  POCT Blood Glucose.: 223 mg/dL (14 Nov 2018 17:23)  POCT Blood Glucose.: 242 mg/dL (14 Nov 2018 16:44)  POCT Blood Glucose.: 186 mg/dL (14 Nov 2018 12:21)                            13.1   10.54 )-----------( 268      ( 15 Nov 2018 06:53 )             37.1       CMP:  11-15 @ 06:53  SGPT 29  Albumin 3.1   Alk Phos 74   Anion Gap 12   SGOT 16   Total Bili 0.8   BUN 10   Calcium Total 9.1   CO2 25   Chloride 104   Creatinine 0.87   eGFR if    eGFR if non AA 95   Glucose 158   Potassium 4.6   Protein 6.0   Sodium 141      Thyroid Function Tests:  11-13 @ 20:54 TSH 1.63 FreeT4 -- T3 -- Anti TPO -- Anti Thyroglobulin Ab -- TSI --      Diabetes Tests: 11-14 @ 10:34 HbA1c 7.6 C-Peptide --         Radiology:

## 2018-11-15 NOTE — DISCHARGE NOTE ADULT - PROVIDER TOKENS
FREE:[LAST:[Endocrinology],PHONE:[(   )    -],FAX:[(   )    -],ADDRESS:[F/U next week as you have planned]],FREE:[LAST:[primary care doctor],PHONE:[(   )    -],FAX:[(   )    -],ADDRESS:[please make appointment to see within 2 weeks of discharge]]

## 2018-11-15 NOTE — DISCHARGE NOTE ADULT - HOSPITAL COURSE
HPI:  58M with DM1 with insulin pump (reported last a1c 7.2% in 8/2018) who presents with hyperglycemia and tachycardia.  Patient woke up with a FS of 170 which is elevated for him.  He decided to go on the treadmill for 30 mins and rechecked his FS and it was 365 (which is extremely elevated for him).  Patient also noted his HR would be persistent elevated to the 140s, even after two hours.  Patient admitted to some nausea and vomiting (non-bloody), slight palpitations and dizziness.  Denied any chest pain or SOB or abdominal pain or diarrhea.  PAtient went to his PMD, had an EKG, and was told to come to the ED for further evaluation.  Before today, patient has been feeling well and has been in his usual state of health.  Denied any fevers or any recent illnesses.  Denied any sick contacts.  Recently traveled to [place] last month.  Had already received his flu vaccination.  In the ED, patient was found to have an initial FS of 378.  His AG was 17 and his pH 7.30.  Patient was started on an insulin gtt for possible DKA.  Patient was also given 2L of fluids, found to have an elevated lactate of 3.3, and was given another liter of fluids.  Currently the patient already feels improved. (13 Nov 2018 19:57)    Hospital Course:  DKA resolved quickly, no evidence of infection - so no antibiotics were given.  Seen by Endocrinology, stable for discharge from their standpoint.  Will go back on his pump at home and see his endocrinologist next week.

## 2018-11-15 NOTE — DISCHARGE NOTE ADULT - CARE PLAN
Principal Discharge DX:	Diabetic ketoacidosis without coma associated with type 1 diabetes mellitus  Goal:	prevent recurrence  Assessment and plan of treatment:	- resume your Insulin Pump as soon as you get home  - see your endocrinologist next week as planned  - adhere to proper diet  - call 911 if symptoms recur  Secondary Diagnosis:	MIKE (acute kidney injury)  Assessment and plan of treatment:	- due to dehydration  - resolved  Secondary Diagnosis:	Tachycardia  Assessment and plan of treatment:	- due to dehdyration  - resolved  - call 911 if you get palpitations

## 2018-11-15 NOTE — PROGRESS NOTE ADULT - PROBLEM SELECTOR PLAN 1
- early DKA with quick resolution  - s/p insulin drip, received Lantus 10 units last two nights  - added pre-meal bolus yesterday  - sliding scale   - check HgbA1c - 7.6  - has used insulin pump for ~ 20 years, new automated monitoring pump started 2ya, noted that he was recalibrating his pump on Monday evening and it was a tedious process - ? related as his sugars were elevated on Tues morning  - endocrinology consult  Dr. Betancourt appreciated, patient will go back on his pump and f/u with his endocrinologist next week (he has an appointment)  - no evidence for infection

## 2018-11-15 NOTE — CONSULT NOTE ADULT - PROBLEM SELECTOR RECOMMENDATION 9
s/p insulin gtt; now resolved  pt to resume insulin pump 670g medtronic upon d/c  pt is medically cleared from an endocrine standpoint

## 2018-11-15 NOTE — DISCHARGE NOTE ADULT - PATIENT PORTAL LINK FT
You can access the [facility] Patient Portal, offered by [facility], by registering with the following website: [email or web address]

## 2018-11-15 NOTE — DISCHARGE NOTE ADULT - MEDICATION SUMMARY - MEDICATIONS TO TAKE
I will START or STAY ON the medications listed below when I get home from the hospital:    ramipril 10 mg oral capsule  -- 1 cap(s) by mouth once a day  -- Indication: For Preventive measure    insulin  -- Via punp  -- Indication: For Diabetes mellitus of other type with ketoacidosis without coma    atorvastatin 40 mg oral tablet  -- 1 tab(s) by mouth once a day  -- Indication: For hyperlipidemia    Zetia 10 mg oral tablet  -- 1 tab(s) by mouth once a day  -- Indication: For hyperlipidemia

## 2018-11-15 NOTE — DISCHARGE NOTE ADULT - PLAN OF CARE
prevent recurrence - resume your Insulin Pump as soon as you get home  - see your endocrinologist next week as planned  - adhere to proper diet  - call 911 if symptoms recur - due to dehydration  - resolved - due to dehdyration  - resolved  - call 911 if you get palpitations

## 2018-11-15 NOTE — PROGRESS NOTE ADULT - ASSESSMENT
The patient is a 58 year old male with a history of IDDM on insulin pump who presents with tachycardia and hyperglycemia.    Plan:  - Tachycardia was due to dehydration and hyperglycemia  - Heart rates now within normal limits  - DKA largely resolved. Anion gap no longer present.  - Discontinue telemetry  - Endocrine eval  - Discharge planning

## 2018-11-15 NOTE — PROGRESS NOTE ADULT - SUBJECTIVE AND OBJECTIVE BOX
Chief Complaint: Hyperglycemia, tachycardia    Interval Events: No events overnight. No complaints.    Review of Systems:  General: No fevers, chills, weight loss or gain  Skin: No rashes, color changes  Cardiovascular: No chest pain, orthopnea  Respiratory: No shortness of breath, cough  Gastrointestinal: No nausea, abdominal pain  Genitourinary: No incontinence, pain with urination  Musculoskeletal: No pain, swelling, decreased range of motion  Neurological: No headache, weakness  Psychiatric: No depression, anxiety  Endocrine: No weight loss or gain, increased thirst  All other systems are comprehensively negative.    Physical Exam:  Vital Signs Last 24 Hrs  T(C): 36.9 (15 Nov 2018 07:56), Max: 37.1 (14 Nov 2018 23:05)  T(F): 98.5 (15 Nov 2018 07:56), Max: 98.8 (14 Nov 2018 23:05)  HR: 59 (15 Nov 2018 07:56) (59 - 85)  BP: 143/73 (15 Nov 2018 07:56) (113/67 - 143/73)  BP(mean): --  RR: 16 (15 Nov 2018 07:56) (14 - 21)  SpO2: 94% (15 Nov 2018 07:56) (93% - 96%)  General: NAD  HEENT: MMM  Neck: No JVD, no carotid bruit  Lungs: CTAB  CV: RRR, nl S1/S2, no M/R/G  Abdomen: S/NT/ND, +BS  Extremities: No LE edema, no cyanosis  Neuro: AAOx3, non-focal  Skin: No rash    Labs:             11-15    141  |  104  |  10  ----------------------------<  158<H>  4.6   |  25  |  0.87    Ca    9.1      15 Nov 2018 06:53  Phos  3.0     11-14  Mg     1.6     11-14    TPro  6.0  /  Alb  3.1<L>  /  TBili  0.8  /  DBili  x   /  AST  16  /  ALT  29  /  AlkPhos  74  11-15                        13.1   10.54 )-----------( 268      ( 15 Nov 2018 06:53 )             37.1       Telemetry: Sinus rhythm

## 2018-11-15 NOTE — PROGRESS NOTE ADULT - ASSESSMENT
58 year old  male with  type 1 diabetes, self manages his insulin via  670G and CGM.  Presently on basal bolus regime with  glucose control.  To resume insulin pump after discharge, no further needs at this time

## 2018-11-15 NOTE — PROGRESS NOTE ADULT - SUBJECTIVE AND OBJECTIVE BOX
INTERVAL HPI/OVERNIGHT EVENTS:   Patient seen and examined.  Ate breakfast, no recurrent nausea or vomiting.  Feeling back to baseline, ambulating at baseline.  No fevers, chills, sweats, dizziness, HA, changes in vision, cp, palpitations, sob, persistent cough, n/v/d, abd pain, dysuria, focal weakness.     REVIEW OF SYSTEMS:  See HPI,  all others negative    PHYSICAL EXAM:  Vital Signs Last 24 Hrs  T(C): 36.9 (15 Nov 2018 07:56), Max: 37.1 (14 Nov 2018 23:05)  T(F): 98.5 (15 Nov 2018 07:56), Max: 98.8 (14 Nov 2018 23:05)  HR: 59 (15 Nov 2018 07:56) (59 - 85)  BP: 143/73 (15 Nov 2018 07:56) (113/67 - 143/73)  BP(mean): --  RR: 16 (15 Nov 2018 07:56) (14 - 21)  SpO2: 94% (15 Nov 2018 07:56) (93% - 96%)    GENERAL: NAD, well-groomed, well-developed, awake, alert, oriented x 3, fluent and coherent speech  EYES: EOMI, PERRLA, conjunctiva and sclera clear  ENMT: No tonsillar erythema, exudates, or enlargement; Moist mucous membranes, Good dentition, No lesions  NECK: Supple, No JVD, No Cervical LAD   NERVOUS SYSTEM:  Good concentration; Moving all 4 extremities; No gross sensory deficits, No facial droop  CHEST WALL: No masses  CHEST/LUNG: Clear to auscultation bilaterally; No rales, rhonchi, wheezing, or rubs  HEART: Regular rate and rhythm; No murmurs, rubs, or gallops  ABDOMEN: Soft, Nontender, obese, Bowel sounds present, No palpable masses or organomegaly, No bruits  EXTREMITIES:  2+ Peripheral Pulses, No clubbing, cyanosis, or edema      LABS:                          13.1   10.54 )-----------( 268      ( 15 Nov 2018 06:53 )             37.1     11-15    141  |  104  |  10  ----------------------------<  158<H>  4.6   |  25  |  0.87    Ca    9.1      15 Nov 2018 06:53  Phos  3.0     11-14  Mg     1.6     11-14    TPro  6.0  /  Alb  3.1<L>  /  TBili  0.8  /  DBili  x   /  AST  16  /  ALT  29  /  AlkPhos  74  11-15      Culture - Blood (collected 13 Nov 2018 21:39)  Source: .Blood Blood  Preliminary Report (14 Nov 2018 22:01):    No growth to date.    Culture - Blood (collected 13 Nov 2018 21:39)  Source: .Blood Blood  Preliminary Report (14 Nov 2018 22:01):    No growth to date.      Hemoglobin A1C, Whole Blood: 7.6    < from: US Duplex Venous Lower Ext Complete, Bilateral (11.14.18 @ 10:38) >  IMPRESSION:  No evidence of DVT.      < end of copied text >

## 2018-11-15 NOTE — PROGRESS NOTE ADULT - SUBJECTIVE AND OBJECTIVE BOX
Type 1 DM       MEDICATIONS  (STANDING):  atorvastatin 40 milliGRAM(s) Oral at bedtime  dextrose 5%. 1000 milliLiter(s) (50 mL/Hr) IV Continuous <Continuous>  dextrose 50% Injectable 12.5 Gram(s) IV Push once  dextrose 50% Injectable 25 Gram(s) IV Push once  dextrose 50% Injectable 25 Gram(s) IV Push once  heparin  Injectable 5000 Unit(s) SubCutaneous every 8 hours  insulin glargine Injectable (LANTUS) 10 Unit(s) SubCutaneous at bedtime  insulin lispro (HumaLOG) corrective regimen sliding scale   SubCutaneous three times a day before meals  insulin lispro (HumaLOG) corrective regimen sliding scale   SubCutaneous at bedtime  insulin lispro Injectable (HumaLOG) 3 Unit(s) SubCutaneous three times a day before meals    MEDICATIONS  (PRN):  dextrose 40% Gel 15 Gram(s) Oral once PRN Blood Glucose LESS THAN 70 milliGRAM(s)/deciliter  glucagon  Injectable 1 milliGRAM(s) IntraMuscular once PRN Glucose LESS THAN 70 milligrams/deciliter      Allergies    No Known Allergies    Intolerances      Constitutional: No fever  HEENT: No pain  Cardiovascular: No chest pain, palpitation  Respiratory: No SOB, no cough  GI: No nausea, vomiting, abdominal pain  Endocrine: no polyuria, polydipsia    PHYSICAL EXAM:  VITALS: T(C): 36.9 (11-15-18 @ 09:47)  T(F): 98.5 (11-15-18 @ 09:47), Max: 98.8 (11-14-18 @ 23:05)  HR: 76 (11-15-18 @ 09:47) (59 - 85)  BP: 121/75 (11-15-18 @ 09:47) (113/67 - 143/73)  RR:  (14 - 21)  SpO2:  (93% - 96%)  Wt(kg): --  GENERAL: NAD, well-groomed, well-developed  HEENT:  Atraumatic, Normocephalic, moist mucous membranes  RESPIRATORY: Clear to auscultation bilaterally; No rales, rhonchi, wheezing, or rubs  CARDIOVASCULAR: Regular rate and rhythm; No murmurs; no peripheral edema  PSYCH: Alert and oriented x 3, normal affect, normal mood      POCT Blood Glucose.: 179 mg/dL (11-15-18 @ 07:48)  POCT Blood Glucose.: 150 mg/dL (11-14-18 @ 21:54)  POCT Blood Glucose.: 223 mg/dL (11-14-18 @ 17:23)  POCT Blood Glucose.: 242 mg/dL (11-14-18 @ 16:44)  POCT Blood Glucose.: 186 mg/dL (11-14-18 @ 12:21)  POCT Blood Glucose.: 113 mg/dL (11-14-18 @ 08:15)  POCT Blood Glucose.: 155 mg/dL (11-14-18 @ 05:39)  POCT Blood Glucose.: 165 mg/dL (11-14-18 @ 02:02)  POCT Blood Glucose.: 146 mg/dL (11-14-18 @ 00:15)  POCT Blood Glucose.: 390 mg/dL (11-13-18 @ 22:16)  POCT Blood Glucose.: 221 mg/dL (11-13-18 @ 20:18)  POCT Blood Glucose.: 236 mg/dL (11-13-18 @ 18:16)  POCT Blood Glucose.: 309 mg/dL (11-13-18 @ 17:31)  POCT Blood Glucose.: 378 mg/dL (11-13-18 @ 16:19)      11-15    141  |  104  |  10  ----------------------------<  158<H>  4.6   |  25  |  0.87    EGFR if : 110  EGFR if non : 95      Hemoglobin A1C, Whole Blood: 7.6 % <H> [4.0 - 5.6] (11-14-18 @ 10:34)

## 2018-11-15 NOTE — DISCHARGE NOTE ADULT - CARE PROVIDER_API CALL
Endocrinology,   F/U next week as you have planned  Phone: (   )    -  Fax: (   )    -    primary care doctor,   please make appointment to see within 2 weeks of discharge  Phone: (   )    -  Fax: (   )    -

## 2018-11-15 NOTE — PROGRESS NOTE ADULT - PROBLEM SELECTOR PLAN 3
- likely 2/2 dehydration, DKA  - monitor with IV hydration  - resolved  -  cardiology f/u appreciated  - patient is low pretest prob for VTE, but does report recent travel to [place] and [place], LE Dopplers neg for DVT

## 2018-11-18 LAB
CULTURE RESULTS: SIGNIFICANT CHANGE UP
CULTURE RESULTS: SIGNIFICANT CHANGE UP
SPECIMEN SOURCE: SIGNIFICANT CHANGE UP
SPECIMEN SOURCE: SIGNIFICANT CHANGE UP

## 2019-12-31 NOTE — PATIENT PROFILE ADULT - PATIENT REPRESENTATIVE: ( YOU CAN CHOOSE ANY PERSON THAT CAN ASSIST YOU WITH YOUR HEALTH CARE PREFERENCES, DOES NOT HAVE TO BE A SPOUSE, IMMEDIATE FAMILY OR SIGNIFICANT OTHER/PARTNER)
"Oncology Rooming Note    December 31, 2019 3:33 PM   Angel Yanez is a 61 year old male who presents for:    Chief Complaint   Patient presents with     RECHECK     Return: Multiple Myeloma      Initial Vitals: There were no vitals taken for this visit. Estimated body mass index is 23.37 kg/m  as calculated from the following:    Height as of 12/16/19: 1.778 m (5' 10\").    Weight as of an earlier encounter on 12/31/19: 73.9 kg (162 lb 14.4 oz). There is no height or weight on file to calculate BSA.  Data Unavailable Comment: Data Unavailable   No LMP for male patient.  Allergies reviewed: Yes  Medications reviewed: Yes    Medications: Medication refills not needed today.  Pharmacy name entered into EPIC:    [facility] [place] [facility] [address] [facility] [place], [place] [address]    Clinical concerns: None       Kathy Perez CMA              "
declines

## 2020-07-16 NOTE — H&P ADULT - NSHPREVIEWOFSYSTEMS_GEN_ALL_CORE
REVIEW OF SYSTEMS:  CONSTITUTIONAL:    no fever or weight loss or fatigue  EYES:    no eye pain or visual disturbances or discharge  ENMT:     no difficulty hearing or tinnitus or vertigo, no sinus or throat pain  NECK:    no pain or stiffness  RESPIRATORY:    no cough or wheezing or chills or hemoptysis, no shortness of breath  CARDIOVASCULAR:    +slight palpitations, no chest pain or dizziness or leg swelling  GASTROINTESTINAL:    +nausea/vomiting, no abdominal or epigastric pain. no hematemesis, no diarrhea or constipation. no melena or hematochezia.  GENITOURINARY:    no dysuria or frequency or hematuria or incontinence  NEUROLOGICAL:    no headaches or memory loss or loss of strength or numbness or tremors  SKIN:    no itching or burning or rashes or lesions   LYMPH NODES:    no enlarged glands  ENDOCRINE:    no heat or cold intolerance, no hair loss, no polydipsia or polyuria  MUSCULOSKELETAL:    no joint pain or swelling, no muscle or back or extremity pain  PSYCHIATRIC:    no depression or anxiety or mood swings or difficulty sleeping  HEME/LYMPH:    no easy bruising or bleeding gums  ALLERGY AND IMMUNOLOGIC:    no hives or eczema
(4) rarely moist

## 2020-10-16 PROBLEM — E11.9 TYPE 2 DIABETES MELLITUS WITHOUT COMPLICATIONS: Chronic | Status: ACTIVE | Noted: 2018-11-13

## 2020-10-20 ENCOUNTER — APPOINTMENT (OUTPATIENT)
Dept: RADIOLOGY | Facility: CLINIC | Age: 60
End: 2020-10-20
Payer: COMMERCIAL

## 2020-10-20 ENCOUNTER — OUTPATIENT (OUTPATIENT)
Dept: OUTPATIENT SERVICES | Facility: HOSPITAL | Age: 60
LOS: 1 days | End: 2020-10-20
Payer: COMMERCIAL

## 2020-10-20 DIAGNOSIS — Z00.8 ENCOUNTER FOR OTHER GENERAL EXAMINATION: ICD-10-CM

## 2020-10-20 PROCEDURE — 73630 X-RAY EXAM OF FOOT: CPT

## 2020-10-20 PROCEDURE — 73630 X-RAY EXAM OF FOOT: CPT | Mod: 26,RT

## 2020-11-20 ENCOUNTER — TRANSCRIPTION ENCOUNTER (OUTPATIENT)
Age: 60
End: 2020-11-20

## 2021-01-01 NOTE — DISCHARGE NOTE ADULT - PHYSICIAN SECTION COMPLETE
Informed PT that LC is here to help with BF tonight. Offered assistance but mother declined, said she will call later, when baby is due to BF if she needs help. Reports infant is latching well.PT denies any questions and concerns at this time. Encouraged to call LC if needing further assistance.     Yes

## 2021-01-06 ENCOUNTER — TRANSCRIPTION ENCOUNTER (OUTPATIENT)
Age: 61
End: 2021-01-06

## 2021-08-22 ENCOUNTER — TRANSCRIPTION ENCOUNTER (OUTPATIENT)
Age: 61
End: 2021-08-22

## 2021-09-06 ENCOUNTER — TRANSCRIPTION ENCOUNTER (OUTPATIENT)
Age: 61
End: 2021-09-06

## 2022-02-07 NOTE — DISCHARGE NOTE ADULT - BECAUSE OF A PHYSICAL, MENTAL OR EMOTIONAL CONDITION, DO YOU HAVE DIFFICULTY DOING  ERRANDS ALONE LIKE VISITING A DOCTOR'S OFFICE OR SHOPPING (15 YEARS AND OLDER)
Provider:    Caller:   Time of call:     Phone #:    Surgery:    Surgery Date:    Last visit:     Next visit:     AIDAN:         Reason for call:             
No

## 2022-11-03 ENCOUNTER — RESULT REVIEW (OUTPATIENT)
Age: 62
End: 2022-11-03

## 2022-11-03 ENCOUNTER — APPOINTMENT (OUTPATIENT)
Dept: MRI IMAGING | Facility: CLINIC | Age: 62
End: 2022-11-03

## 2022-11-03 ENCOUNTER — OUTPATIENT (OUTPATIENT)
Dept: OUTPATIENT SERVICES | Facility: HOSPITAL | Age: 62
LOS: 1 days | End: 2022-11-03
Payer: COMMERCIAL

## 2022-11-03 ENCOUNTER — APPOINTMENT (OUTPATIENT)
Dept: CT IMAGING | Facility: CLINIC | Age: 62
End: 2022-11-03

## 2022-11-03 DIAGNOSIS — Z00.8 ENCOUNTER FOR OTHER GENERAL EXAMINATION: ICD-10-CM

## 2022-11-03 PROCEDURE — 70544 MR ANGIOGRAPHY HEAD W/O DYE: CPT

## 2022-11-03 PROCEDURE — 71275 CT ANGIOGRAPHY CHEST: CPT | Mod: 26

## 2022-11-03 PROCEDURE — 71275 CT ANGIOGRAPHY CHEST: CPT

## 2022-11-03 PROCEDURE — 70544 MR ANGIOGRAPHY HEAD W/O DYE: CPT | Mod: 26

## 2023-06-27 ENCOUNTER — APPOINTMENT (OUTPATIENT)
Dept: ORTHOPEDIC SURGERY | Facility: CLINIC | Age: 63
End: 2023-06-27

## 2024-07-25 ENCOUNTER — APPOINTMENT (OUTPATIENT)
Dept: INFECTIOUS DISEASE | Facility: CLINIC | Age: 64
End: 2024-07-25
Payer: SELF-PAY

## 2024-07-25 DIAGNOSIS — Z71.84 ENC FOR HEALTH COUNSELING RELATED TO TRAVEL: ICD-10-CM

## 2024-07-25 PROCEDURE — 90632 HEPA VACCINE ADULT IM: CPT

## 2024-07-25 PROCEDURE — 99401 PREV MED CNSL INDIV APPRX 15: CPT | Mod: 25

## 2024-07-25 PROCEDURE — 90471 IMMUNIZATION ADMIN: CPT | Mod: NC

## 2024-07-25 PROCEDURE — 90472 IMMUNIZATION ADMIN EACH ADD: CPT | Mod: NC

## 2024-07-25 PROCEDURE — 90691 TYPHOID VACCINE IM: CPT

## 2024-07-25 PROCEDURE — 90715 TDAP VACCINE 7 YRS/> IM: CPT

## 2024-07-25 RX ORDER — AZITHROMYCIN 250 MG/1
250 TABLET, FILM COATED ORAL
Qty: 4 | Refills: 0 | Status: ACTIVE | COMMUNITY
Start: 2024-07-25 | End: 1900-01-01

## 2024-07-25 RX ORDER — ATOVAQUONE AND PROGUANIL HYDROCHLORIDE 250; 100 MG/1; MG/1
250-100 TABLET, FILM COATED ORAL DAILY
Qty: 20 | Refills: 0 | Status: ACTIVE | COMMUNITY
Start: 2024-07-25 | End: 1900-01-01

## 2025-01-31 ENCOUNTER — TRANSCRIPTION ENCOUNTER (OUTPATIENT)
Age: 65
End: 2025-01-31